# Patient Record
Sex: FEMALE | Race: WHITE | NOT HISPANIC OR LATINO | Employment: FULL TIME | URBAN - METROPOLITAN AREA
[De-identification: names, ages, dates, MRNs, and addresses within clinical notes are randomized per-mention and may not be internally consistent; named-entity substitution may affect disease eponyms.]

---

## 2024-08-07 ENCOUNTER — OFFICE VISIT (OUTPATIENT)
Dept: OBSTETRICS AND GYNECOLOGY | Facility: CLINIC | Age: 34
End: 2024-08-07
Attending: OBSTETRICS & GYNECOLOGY
Payer: COMMERCIAL

## 2024-08-07 VITALS
WEIGHT: 147.06 LBS | BODY MASS INDEX: 25.11 KG/M2 | DIASTOLIC BLOOD PRESSURE: 64 MMHG | HEIGHT: 64 IN | SYSTOLIC BLOOD PRESSURE: 122 MMHG

## 2024-08-07 DIAGNOSIS — Z01.419 WELL WOMAN EXAM WITH ROUTINE GYNECOLOGICAL EXAM: Primary | ICD-10-CM

## 2024-08-07 DIAGNOSIS — Z23 NEED FOR HPV VACCINATION: ICD-10-CM

## 2024-08-07 PROCEDURE — 99395 PREV VISIT EST AGE 18-39: CPT | Mod: S$GLB,,, | Performed by: OBSTETRICS & GYNECOLOGY

## 2024-08-07 PROCEDURE — 88141 CYTOPATH C/V INTERPRET: CPT | Mod: ,,, | Performed by: STUDENT IN AN ORGANIZED HEALTH CARE EDUCATION/TRAINING PROGRAM

## 2024-08-07 PROCEDURE — 88175 CYTOPATH C/V AUTO FLUID REDO: CPT | Performed by: STUDENT IN AN ORGANIZED HEALTH CARE EDUCATION/TRAINING PROGRAM

## 2024-08-07 PROCEDURE — 87624 HPV HI-RISK TYP POOLED RSLT: CPT | Performed by: OBSTETRICS & GYNECOLOGY

## 2024-08-07 PROCEDURE — 99999 PR PBB SHADOW E&M-NEW PATIENT-LVL III: CPT | Mod: PBBFAC,,, | Performed by: OBSTETRICS & GYNECOLOGY

## 2024-08-07 RX ORDER — MAGNESIUM 200 MG
TABLET ORAL
COMMUNITY
Start: 2023-06-01

## 2024-08-07 RX ORDER — TRETINOIN 0.5 MG/G
CREAM TOPICAL NIGHTLY
COMMUNITY

## 2024-08-07 RX ORDER — AZELASTINE 1 MG/ML
SPRAY, METERED NASAL
COMMUNITY
Start: 2024-06-11

## 2024-08-07 NOTE — PROGRESS NOTES
CC: Well woman exam    Charlotte Eugenie Babington is a 34 y.o. female  presents for well woman exam.  LMP: Patient's last menstrual period was 2024 (exact date)..  No issues, problems, or complaints.  Previously saw Dr. 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  Has PCP, following lipids, etc... no current meds, had very bad reaction to prozac last year.    Declines STD testing- , monogamous.  Periods are regular, sometimes cycles are up to 36 days.  Using condoms and NFP.    History reviewed. No pertinent past medical history.  Past Surgical History:   Procedure Laterality Date    BALLOON SINUPLASTY OF PARANASAL SINUS      WISDOM TOOTH EXTRACTION       Social History     Socioeconomic History    Marital status: Single   Tobacco Use    Smoking status: Never    Smokeless tobacco: Never   Substance and Sexual Activity    Alcohol use: Yes     Alcohol/week: 3.0 standard drinks of alcohol     Types: 3 Drinks containing 0.5 oz of alcohol per week    Drug use: Not Currently    Sexual activity: Yes     Partners: Male     Birth control/protection: Condom, Coitus interruptus     Social Determinants of Health     Financial Resource Strain: Low Risk  (2022)    Received from [facility]    Overall Financial Resource Strain (CARDIA)     Difficulty of Paying Living Expenses: Not hard at all   Food Insecurity: No Food Insecurity (2022)    Received from [facility]    Hunger Vital Sign     Worried About Running Out of Food in the Last Year: Never true     Ran Out of Food in the Last Year: Never true   Transportation Needs: No Transportation Needs (2022)    Received from [facility]    PRAPARE - Transportation     Lack of Transportation (Medical): No     Lack of Transportation (Non-Medical): No   Physical Activity: Sufficiently Active (2022)    Received from [facility]    Exercise Vital Sign     Days of Exercise per Week: 5 days     Minutes of Exercise per Session: 30  "min   Stress: Stress Concern Present (2022)    Received from [facility] - Occupational Stress Questionnaire     Feeling of Stress : Rather much     Family History   Problem Relation Name Age of Onset    Hypertension Mother      Depression Mother      Hyperlipidemia Father      Depression Father      Bipolar disorder Sister      Breast cancer Paternal Grandmother      Ovarian cancer Neg Hx      Colon cancer Neg Hx       OB History          0    Para   0    Term   0       0    AB   0    Living   0         SAB   0    IAB   0    Ectopic   0    Multiple   0    Live Births   0                 /64   Ht 5' 4" (1.626 m)   Wt 66.7 kg (147 lb 0.8 oz)   LMP 2024 (Exact Date)   BMI 25.24 kg/m²       ROS:  GENERAL: Denies weight gain or weight loss. Feeling well overall.   SKIN: Denies rash or lesions.   HEAD: Denies head injury or headache.   NODES: Denies enlarged lymph nodes.   CHEST: Denies chest pain or shortness of breath.   CARDIOVASCULAR: Denies palpitations or left sided chest pain.   ABDOMEN: No abdominal pain, constipation, diarrhea, nausea, vomiting or rectal bleeding.   URINARY: No frequency, dysuria, hematuria, or burning on urination.  REPRODUCTIVE: See HPI.   BREASTS: The patient performs breast self-examination and denies pain, lumps, or nipple discharge.   HEMATOLOGIC: No easy bruisability or excessive bleeding.   MUSCULOSKELETAL: Denies joint pain or swelling.   NEUROLOGIC: Denies syncope or weakness.   PSYCHIATRIC: Denies depression, anxiety or mood swings.    PHYSICAL EXAM:  APPEARANCE: Well nourished, well developed, in no acute distress.  AFFECT: WNL, alert and oriented x 3  SKIN: No acne or hirsutism  NECK: Neck symmetric without masses or thyromegaly  NODES: No inguinal, cervical, axillary, or femoral lymph node enlargement  CHEST: Good respiratory effect  ABDOMEN: Soft.  No tenderness or masses.  No hepatosplenomegaly.  No " hernias.  BREASTS: Symmetrical, no skin changes or visible lesions.  No palpable masses, nipple discharge bilaterally.  PELVIC: Normal external genitalia without lesions.  Normal hair distribution.  Adequate perineal body, normal urethral meatus.  Vagina moist and well rugated without lesions or discharge.  Cervix pink, without lesions, discharge or tenderness.  No significant cystocele or rectocele.  Bimanual exam shows uterus to be normal size, regular, mobile and nontender.  Adnexa without masses or tenderness.    EXTREMITIES: No edema.    Well woman exam with routine gynecological exam  -     Liquid-Based Pap Smear, Screening  -     HPV High Risk Genotypes, PCR    Other orders  -     hpv vaccine,9-bettye (GARDASIL 9) vaccine 0.5 mL            Patient was counseled today on A.C.S. Pap guidelines and recommendations for yearly pelvic exams, mammograms and monthly self breast exams; to see her PCP for other health maintenance.     No follow-ups on file.                    Answers submitted by the patient for this visit:  Gynecologic Exam Questionnaire  (Submitted on 2024)  Chief Complaint: Gynecologic exam  genital itching: No  genital lesions: No  genital odor: No  genital rash: No  missed menses: No  pelvic pain: No  vaginal bleeding: No  vaginal discharge: No  Pregnant now?: No  abdominal pain: No  anorexia: No  back pain: No  chills: No  constipation: No  diarrhea: No  discolored urine: No  dysuria: No  fever: No  flank pain: No  frequency: No  headaches: No  hematuria: No  nausea: No  painful intercourse: No  rash: No  urgency: No  vomiting: No  Please select the characteristics of your discharge: : normal, clear, green, scant, yellow  Vaginal bleeding: no bleeding  Passing clots?: No  Passing tissue?: No  Sexual activity: sexually active  Birth control: nothing, condoms  Menstrual history: regular  STD: No  abdominal surgery: No   section: No  Ectopic pregnancy: No  Endometriosis: No  herpes simplex:  No  gynecological surgery: No  menorrhagia: No  metrorrhagia: No  miscarriage: No  ovarian cysts: No  perineal abscess: No  PID: No  terminated pregnancy: No  vaginosis: No

## 2024-08-12 ENCOUNTER — TELEPHONE (OUTPATIENT)
Dept: OBSTETRICS AND GYNECOLOGY | Facility: CLINIC | Age: 34
End: 2024-08-12
Payer: COMMERCIAL

## 2024-08-12 LAB
FINAL PATHOLOGIC DIAGNOSIS: ABNORMAL
Lab: ABNORMAL

## 2024-08-12 NOTE — TELEPHONE ENCOUNTER
----- Message from Rach Bain sent at 8/12/2024  2:30 PM CDT -----  Pt called to discuss her pap smear results. Pls call the pt and advise.

## 2024-08-13 ENCOUNTER — PATIENT MESSAGE (OUTPATIENT)
Dept: OBSTETRICS AND GYNECOLOGY | Facility: CLINIC | Age: 34
End: 2024-08-13
Payer: COMMERCIAL

## 2024-08-13 LAB
HPV HR 12 DNA SPEC QL NAA+PROBE: POSITIVE
HPV16 AG SPEC QL: NEGATIVE
HPV18 DNA SPEC QL NAA+PROBE: NEGATIVE

## 2024-08-14 ENCOUNTER — TELEPHONE (OUTPATIENT)
Dept: OBSTETRICS AND GYNECOLOGY | Facility: CLINIC | Age: 34
End: 2024-08-14
Payer: COMMERCIAL

## 2024-08-14 DIAGNOSIS — Z23 NEED FOR HPV VACCINATION: Primary | ICD-10-CM

## 2024-09-19 ENCOUNTER — CLINICAL SUPPORT (OUTPATIENT)
Dept: OBSTETRICS AND GYNECOLOGY | Facility: CLINIC | Age: 34
End: 2024-09-19
Payer: COMMERCIAL

## 2024-09-19 DIAGNOSIS — Z23 NEED FOR HPV VACCINATION: Primary | ICD-10-CM

## 2024-09-19 PROCEDURE — 90471 IMMUNIZATION ADMIN: CPT | Mod: S$GLB,,, | Performed by: OBSTETRICS & GYNECOLOGY

## 2024-09-19 PROCEDURE — 99999 PR PBB SHADOW E&M-EST. PATIENT-LVL I: CPT | Mod: PBBFAC,,,

## 2024-09-19 PROCEDURE — 90651 9VHPV VACCINE 2/3 DOSE IM: CPT | Mod: S$GLB,,, | Performed by: OBSTETRICS & GYNECOLOGY

## 2024-09-19 NOTE — PROGRESS NOTES
Here for Gardasil # 1 injection, without complaint at this time. Reports no pain before or after injection. Advised to wait in lobby 15 minutes after injection and report any adverse reactions.     Return to clinic in NOVEMBER for next injection.      Site: ABY

## 2024-10-08 ENCOUNTER — PROCEDURE VISIT (OUTPATIENT)
Dept: OBSTETRICS AND GYNECOLOGY | Facility: CLINIC | Age: 34
End: 2024-10-08
Attending: OBSTETRICS & GYNECOLOGY
Payer: COMMERCIAL

## 2024-10-08 VITALS — DIASTOLIC BLOOD PRESSURE: 66 MMHG | SYSTOLIC BLOOD PRESSURE: 97 MMHG | BODY MASS INDEX: 26.38 KG/M2 | WEIGHT: 153.69 LBS

## 2024-10-08 DIAGNOSIS — R87.613 HSIL (HIGH GRADE SQUAMOUS INTRAEPITHELIAL LESION) ON PAP SMEAR OF CERVIX: Primary | ICD-10-CM

## 2024-10-08 LAB
B-HCG UR QL: NEGATIVE
CTP QC/QA: YES

## 2024-10-08 PROCEDURE — 88305 TISSUE EXAM BY PATHOLOGIST: CPT | Performed by: PATHOLOGY

## 2024-10-08 PROCEDURE — 57454 BX/CURETT OF CERVIX W/SCOPE: CPT | Mod: S$GLB,,, | Performed by: OBSTETRICS & GYNECOLOGY

## 2024-10-08 PROCEDURE — 81025 URINE PREGNANCY TEST: CPT | Mod: S$GLB,,, | Performed by: OBSTETRICS & GYNECOLOGY

## 2024-10-08 PROCEDURE — 88342 IMHCHEM/IMCYTCHM 1ST ANTB: CPT | Mod: 59 | Performed by: PATHOLOGY

## 2024-10-08 NOTE — PROCEDURES
Colposcopy W/BIOPSY AND ECC- Today    Date/Time: 10/8/2024 3:00 PM    Performed by: Rimma Pires DO  Authorized by: Rimma Pires DO    Timeout:Immediately prior to procedure a time out was called to verify the correct patient, procedure, equipment, support staff and site/side marked as required  Prep:Patient was prepped and draped in the usual sterile fashion    Colposcopy Site:  Cervix  Acrowhite Lesion? Yes    Atypical Vessels: No    Transformation Zone Adequate?: Yes    Biopsy?: Yes         Location:  Cervix ((6 00, 12 00 and 8 00))  ECC Performed?: Yes    LEEP Performed?: No     Patient tolerated the procedure well with no immediate complications.   Post-operative instructions were provided for the patient.   Patient was discharged and will follow up if any complications occur     Has started gardasil series.  Prior HPV pos with neg pap.  No prior colposcopy with Dr. Ervin.

## 2024-10-11 LAB
FINAL PATHOLOGIC DIAGNOSIS: NORMAL
GROSS: NORMAL
Lab: NORMAL

## 2024-10-17 ENCOUNTER — TELEPHONE (OUTPATIENT)
Dept: UROGYNECOLOGY | Facility: CLINIC | Age: 34
End: 2024-10-17
Payer: COMMERCIAL

## 2024-10-17 NOTE — TELEPHONE ENCOUNTER
Returned pt's call regarding colpo results. Informed pt that Dr. Pires is out of the office today but I will remind her to reach out with results when she returns. Pt SAMEER.

## 2024-11-04 ENCOUNTER — PATIENT MESSAGE (OUTPATIENT)
Dept: OBSTETRICS AND GYNECOLOGY | Facility: CLINIC | Age: 34
End: 2024-11-04
Payer: COMMERCIAL

## 2024-11-06 ENCOUNTER — TELEPHONE (OUTPATIENT)
Dept: OBSTETRICS AND GYNECOLOGY | Facility: CLINIC | Age: 34
End: 2024-11-06
Payer: COMMERCIAL

## 2024-11-14 ENCOUNTER — CLINICAL SUPPORT (OUTPATIENT)
Dept: OBSTETRICS AND GYNECOLOGY | Facility: CLINIC | Age: 34
End: 2024-11-14
Payer: COMMERCIAL

## 2024-11-14 DIAGNOSIS — Z23 NEED FOR HPV VACCINATION: Primary | ICD-10-CM

## 2024-11-14 PROCEDURE — 90651 9VHPV VACCINE 2/3 DOSE IM: CPT | Mod: S$GLB,,, | Performed by: OBSTETRICS & GYNECOLOGY

## 2024-11-14 PROCEDURE — 90471 IMMUNIZATION ADMIN: CPT | Mod: S$GLB,,, | Performed by: OBSTETRICS & GYNECOLOGY

## 2024-11-14 PROCEDURE — 99999 PR PBB SHADOW E&M-EST. PATIENT-LVL I: CPT | Mod: PBBFAC,,,

## 2024-11-14 NOTE — PROGRESS NOTES
Here for Gardasil # 2 injection, without complaint at this time. Reports no pain before or after injection. Advised to wait in lobby 15 minutes after injection and report any adverse reactions.     Return to clinic in MARCH for next injection.      Site: ECTOR

## 2025-01-02 ENCOUNTER — TELEPHONE (OUTPATIENT)
Dept: OBSTETRICS AND GYNECOLOGY | Facility: CLINIC | Age: 35
End: 2025-01-02
Payer: COMMERCIAL

## 2025-01-02 NOTE — TELEPHONE ENCOUNTER
Returned pt's call. Pt has questions about colpo results she would like to address with provider over the phone. Informed pt that provider is not in today but I will ask her to give her a call tomorrow. Pt serg.

## 2025-01-02 NOTE — TELEPHONE ENCOUNTER
----- Message from Sandra sent at 1/2/2025  4:09 PM CST -----  Pt called she wanted to get her test results from Colpo she can be reached [phone]

## 2025-01-03 ENCOUNTER — TELEPHONE (OUTPATIENT)
Dept: OBSTETRICS AND GYNECOLOGY | Facility: CLINIC | Age: 35
End: 2025-01-03

## 2025-01-03 NOTE — TELEPHONE ENCOUNTER
Called patient regarding colposcopy f/u, timeline for trying to conceiuve likely late spring so comfortable with repeat colpo in April.

## 2025-03-20 ENCOUNTER — CLINICAL SUPPORT (OUTPATIENT)
Dept: OBSTETRICS AND GYNECOLOGY | Facility: CLINIC | Age: 35
End: 2025-03-20
Payer: COMMERCIAL

## 2025-03-20 DIAGNOSIS — Z23 NEED FOR HPV VACCINATION: Primary | ICD-10-CM

## 2025-03-20 PROCEDURE — 99999 PR PBB SHADOW E&M-EST. PATIENT-LVL I: CPT | Mod: PBBFAC,,,

## 2025-03-20 PROCEDURE — 90471 IMMUNIZATION ADMIN: CPT | Mod: S$GLB,,, | Performed by: OBSTETRICS & GYNECOLOGY

## 2025-03-20 PROCEDURE — 90651 9VHPV VACCINE 2/3 DOSE IM: CPT | Mod: S$GLB,,, | Performed by: OBSTETRICS & GYNECOLOGY

## 2025-03-20 NOTE — PROGRESS NOTES
Here for Gardasil # 3 OF 3 injection, without complaint at this time. Reports no pain prior to or post injection. Advised to wait in lobby 15 minutes post injection and report any adverse reactions.     No further follow ups needed. Series complete.     Site: LD

## 2025-04-29 ENCOUNTER — PROCEDURE VISIT (OUTPATIENT)
Dept: OBSTETRICS AND GYNECOLOGY | Facility: CLINIC | Age: 35
End: 2025-04-29
Attending: OBSTETRICS & GYNECOLOGY
Payer: COMMERCIAL

## 2025-04-29 VITALS
WEIGHT: 154.13 LBS | DIASTOLIC BLOOD PRESSURE: 76 MMHG | HEART RATE: 83 BPM | HEIGHT: 65 IN | BODY MASS INDEX: 25.68 KG/M2 | SYSTOLIC BLOOD PRESSURE: 113 MMHG

## 2025-04-29 DIAGNOSIS — N87.1 DYSPLASIA OF CERVIX, HIGH GRADE CIN 2: Primary | ICD-10-CM

## 2025-04-29 DIAGNOSIS — Z01.818 PREPROCEDURAL EXAMINATION: ICD-10-CM

## 2025-04-29 LAB
B-HCG UR QL: NEGATIVE
CTP QC/QA: YES

## 2025-04-29 PROCEDURE — 88342 IMHCHEM/IMCYTCHM 1ST ANTB: CPT | Mod: TC,59 | Performed by: OBSTETRICS & GYNECOLOGY

## 2025-04-29 PROCEDURE — 81025 URINE PREGNANCY TEST: CPT | Mod: S$GLB,,, | Performed by: OBSTETRICS & GYNECOLOGY

## 2025-04-29 PROCEDURE — 57454 BX/CURETT OF CERVIX W/SCOPE: CPT | Mod: S$GLB,,, | Performed by: OBSTETRICS & GYNECOLOGY

## 2025-04-29 NOTE — PROCEDURES
Colposcopy W/BIOPSY AND ECC- Today    Date/Time: 4/29/2025 4:00 PM    Performed by: Rimma Pires DO  Authorized by: Rimma Pires,     Consent obatined:  Prior to procedure the appropriate consent was completed and verified  Timeout:Immediately prior to procedure a time out was called to verify the correct patient, procedure, equipment, support staff and site/side marked as required    Colposcopy Site:  Cervix  Acrowhite Lesion? Yes    Atypical Vessels? Yes    Transformation Zone Adequate?: Yes    Biopsy?: Yes         Location:  Cervix ((6 00 and 12 00))  ECC Performed?: Yes    LEEP Performed?: No     Patient tolerated the procedure well with no immediate complications.   Post-operative instructions were provided for the patient.   Patient was discharged and will follow up if any complications occur     Discussed if progression to KARTIK 2 or KARTIK 2-3, then will proceed with LEEP, if remains in KARTIK 1-2 or KARTIK 1 range, will continue to monitor with q 6 month colpo.

## 2025-05-02 LAB
ESTROGEN SERPL-MCNC: NORMAL PG/ML
INSULIN SERPL-ACNC: NORMAL U[IU]/ML
LAB AP CLINICAL INFORMATION: NORMAL
LAB AP DIAGNOSIS CATEGORY: NORMAL
LAB AP GROSS DESCRIPTION: NORMAL
LAB AP PERFORMING LOCATION(S): NORMAL
LAB AP REPORT FOOTNOTES: NORMAL

## 2025-05-04 ENCOUNTER — PATIENT MESSAGE (OUTPATIENT)
Dept: OBSTETRICS AND GYNECOLOGY | Facility: HOSPITAL | Age: 35
End: 2025-05-04
Payer: COMMERCIAL

## 2025-06-09 ENCOUNTER — TELEPHONE (OUTPATIENT)
Facility: CLINIC | Age: 35
End: 2025-06-09
Payer: COMMERCIAL

## 2025-06-09 NOTE — TELEPHONE ENCOUNTER
----- Message from Nelda sent at 6/6/2025  4:27 PM CDT -----  Regarding: MRI  Type:  Patient Returning CallName of who is calling:ptWhat is request in detail:patient is requesting a call back in regards to having an MRI ordered before her appt on 08/20. Patient would like to have the orders sent to [facility] in [place] ( [phone] phone)she was a former patient Doretha.Can clinic reply by [facility]:noWhat number to call back if not [facility][phone]

## 2025-06-09 NOTE — TELEPHONE ENCOUNTER
----- Message from Leonie sent at 6/9/2025  2:51 PM CDT -----  Regarding: Miss call  Type:  Patient Returning Call Who Called:  PT Who Left Message for Patient: Sj Arellano, Does the patient know what this is regarding?: yes  Would the patient rather a call back or a response via [facility]?  Call back  Best Call Back Number: [phone] Additional Information:

## 2025-06-24 ENCOUNTER — OFFICE VISIT (OUTPATIENT)
Facility: CLINIC | Age: 35
End: 2025-06-24
Payer: COMMERCIAL

## 2025-06-24 VITALS
HEART RATE: 83 BPM | SYSTOLIC BLOOD PRESSURE: 114 MMHG | BODY MASS INDEX: 25.64 KG/M2 | HEIGHT: 65 IN | DIASTOLIC BLOOD PRESSURE: 77 MMHG

## 2025-06-24 DIAGNOSIS — R93.89 ABNORMAL MRI: Primary | ICD-10-CM

## 2025-06-24 PROCEDURE — 99214 OFFICE O/P EST MOD 30 MIN: CPT | Mod: S$GLB,,, | Performed by: NEUROLOGICAL SURGERY

## 2025-06-24 PROCEDURE — 99999 PR PBB SHADOW E&M-EST. PATIENT-LVL II: CPT | Mod: PBBFAC,,, | Performed by: NEUROLOGICAL SURGERY

## 2025-06-24 NOTE — PROGRESS NOTES
"Name: Charlotte Eugenie Babington  MRN: [id number]   [facility]: [id number]      Date: 06/24/2025      History of Present Illness      Patient presents having been seen last year for symptoms of migratory paresthesias after having taken gabapentin and Prozac.  At the time her workup demonstrated a small extra-axial cystic lesion in the left frontal region which appeared to be benign.  Follow-up scan was anticipated but she has since been lost follow-up until today.  She is not having any new symptoms and in fact her old symptoms have resolved.  She is however thinking of conceiving and wants to make sure that there is no underlying pathology that could be problematic in that regard.             Past Medical History: The patient  has no past medical history on file.    Social History: The patient  reports that she has never smoked. She has never used smokeless tobacco. She reports current alcohol use of about 3.0 standard drinks of alcohol per week. She reports that she does not currently use drugs.    Family History: Their family history includes Bipolar disorder in her sister; Breast cancer in her paternal grandmother; Depression in her father and mother; Hyperlipidemia in her father; Hypertension in her mother.    Allergies: Trazodone and Fluoxetine     Meds: Scheduled Meds:  Continuous Infusions:  PRN Meds:.    Exam:  Physical Exam               /77   Pulse 83   Ht 5' 5" (1.651 m)   BMI 25.64 kg/m²     Constitutional  Well-developed, well-nourished, appears stated age   Ophthalmoscopic  No papilledema with no hemorrhages or exudates bilaterally   Cardiovascular  Radial pulses 2+ and symmetric, no LE edema bilaterally   Neurological    * Mental status      - Orientation  Oriented to person, place, time, and situation     - Memory   Intact recent and remote     - Attention/concentration  Attentive, vigilant during exam     - Language  Naming & repetition intact, +2-step commands     - Fund of knowledge  Aware of " current events     - Executive  Well-organized thoughts     - Other     * Cranial nerves       - CN II  PERRL, visual fields full to confrontation     - CN III, IV, VI  Extraocular movements full, normal pursuits and saccades     - CN V  Sensation V1 - V3 intact     - CN VII  Face strong and symmetric bilaterally     - CN VIII  Hearing intact bilaterally     - CN IX, X  Palate raises midline and symmetric     - CN XI  SCM and trapezius 5/5 bilaterally     - CN XII  Tongue midline   * Motor  Muscle bulk normal, strength 5/5 throughout   * Sensory   Intact to temperature and vibration throughout   * Coordination  No dysmetria with finger-to-nose or heel-to-shin   * Gait  See below.   * Deep tendon reflexes  2+ and symmetric throughout   Babinski downgoing bilaterally     Laboratory/Radiological:Reviewed  - Results:  No visits with results within 1 Month(s) from this visit.   Latest known visit with results is:   Procedure visit on 04/29/2025   Component Date Value Ref Range Status    POC Preg Test, Ur 04/29/2025 Negative  Negative Final     Acceptable 04/29/2025 Yes   Final    Case Report 04/29/2025    Final                    Value:Hayder Rubi - [facility]                           Case: [id number]                                Authorizing Provider:  Rimma Pires DO     Collected:           04/29/2025 04:50 PM          Ordering Location:     [facility]              Received:            04/30/2025 07:15 AM          Pathologist:           Oskar Brenner MD                                                         Specimens:   1) - Cervix, 6 o'clock                                                                              2) - Cervix, 12 o'clock                                                                             3) - Endocervix, ECC                                                                       Clinical Information 04/29/2025    Final                    Value:HIstory of KARTIK  "1-2    Final Diagnosis 04/29/2025    Final                    Value:1. Cervical biopsy at 6 o'clock shows focal mild squamous dysplasia and (CIN1) present in a background of acute and chronic inflammation.  2. Cervical biopsy at 12 o'clock shows mild squamous dysplasia and koilocytotic atypia (CIN1) present in a background of acute and chronic inflammation.  Immunostain for p16 is negative.  The control stains appropriately.  3. Endocervical curettage shows fragments of benign endocervical tissue admixed with mucus and blood clot, no dysplasia identified.      Gross Description 04/29/2025    Final                    Value:1. Cervix: 6 o'clock  MRN # on Epic Label:  [id number]  MRN # on Pathology Label:  [id number]    The specimen is received in formalin labeled "6:00". The specimen consists of 1 tan-yellow fragment of soft tissue admixed with tan-yellow mucoid material measuring 0.4 x 0.4 x 0.2 cm in aggregate. The specimen is filtered in a white tea bag and submitted entirely in cassette 1A.    Grossing was completed by Smita Pino on 4/30/2025.    2. Cervix: 12 o'clock  MRN # on Epic Label:  [id number]  MRN # on Pathology Label:  [id number]    The specimen is received in formalin labeled "12:00". The specimen consists of 1 tan-yellow fragment of soft tissue measuring 0.3 x 0.3 x 0.2 cm. The specimen is filtered in a white tea bag and submitted entirely in cassette 2A.    Grossing was completed by Smita Pino on 4/30/2025.    3. Endocervix: ECC  MRN # on Epic Label:  [id number]  MRN # on Pathology Label:  [id number]    The specimen is received in formalin labeled "ECC". The specimen consists of translucent to                           red-brown mucoid material measuring 1.0 x 0.9 x 0.2 cm in aggregate. The specimen is filtered in a white tea bag and submitted entirely in cassette 3A. The specimen may not survive processing.    Grossing was completed by Smita Pino on 4/30/2025.        Report Footnotes 04/29/2025    Final    " "                Value:Unless the case is a "gross only" or additional testing only, the final diagnosis for each specimen is based on a microscopic examination of appropriate tissue sections.      Performing Location 04/29/2025    Final                    Value:Grossing performed at [facility], [address] [place], [address]    Sign out performed at [facility], [address] [place] [address]        Dx Category 04/29/2025 Benign    Final       - Independent review of images:        Assessment & Plan      Patient has a history of abnormal MRI performed over a year ago.  She is thinking of getting pregnant, and would like a follow-up scan to ensure that her findings have not evolved.  Think that this would be prudent regardless.  A follow-up MRI of the brain with and without contrast will be ordered and followed up immediately         Risks/benefits, potential side effects of medications, and alternative therapies discussed as appropriate.    This note was generated with the assistance of ambient listening technology. Verbal consent was obtained by the patient and accompanying visitor(s) for the recording of patient appointment to facilitate this note. I attest to having reviewed and edited the generated note for accuracy, though some syntax or spelling errors may persist. Please contact the author of this note for any clarification.       ERVIN Mccollum M.D.            "

## 2025-07-15 ENCOUNTER — PATIENT MESSAGE (OUTPATIENT)
Facility: CLINIC | Age: 35
End: 2025-07-15
Payer: COMMERCIAL

## 2025-08-18 ENCOUNTER — PATIENT MESSAGE (OUTPATIENT)
Facility: CLINIC | Age: 35
End: 2025-08-18
Payer: COMMERCIAL

## 2025-08-29 ENCOUNTER — PATIENT MESSAGE (OUTPATIENT)
Dept: OBSTETRICS AND GYNECOLOGY | Facility: CLINIC | Age: 35
End: 2025-08-29
Payer: COMMERCIAL